# Patient Record
Sex: FEMALE | Race: WHITE | NOT HISPANIC OR LATINO | Employment: OTHER | URBAN - METROPOLITAN AREA
[De-identification: names, ages, dates, MRNs, and addresses within clinical notes are randomized per-mention and may not be internally consistent; named-entity substitution may affect disease eponyms.]

---

## 2023-03-29 DIAGNOSIS — F41.9 ANXIETY DISORDER, UNSPECIFIED: ICD-10-CM

## 2023-03-29 DIAGNOSIS — F32.A DEPRESSION, UNSPECIFIED: ICD-10-CM

## 2023-03-29 RX ORDER — ACETAMINOPHEN 500 MG
1 TABLET ORAL DAILY
COMMUNITY
Start: 2021-03-12

## 2023-03-29 RX ORDER — ROSUVASTATIN CALCIUM 10 MG/1
10 TABLET, COATED ORAL DAILY
COMMUNITY
End: 2023-07-28

## 2023-03-29 RX ORDER — ESTRADIOL 0.1 MG/G
CREAM VAGINAL
COMMUNITY
Start: 2020-11-10 | End: 2024-03-29 | Stop reason: SDUPTHER

## 2023-03-29 RX ORDER — DULOXETIN HYDROCHLORIDE 30 MG/1
1 CAPSULE, DELAYED RELEASE ORAL DAILY
COMMUNITY
Start: 2022-07-07 | End: 2023-09-21

## 2023-03-29 RX ORDER — CALCIUM CARBONATE 600 MG
1 TABLET ORAL DAILY
COMMUNITY
Start: 2021-03-11 | End: 2023-09-21 | Stop reason: SINTOL

## 2023-03-29 RX ORDER — LEVOTHYROXINE SODIUM 75 UG/1
TABLET ORAL
COMMUNITY
Start: 2023-02-20

## 2023-03-29 RX ORDER — ACYCLOVIR 400 MG/1
TABLET ORAL
COMMUNITY

## 2023-03-29 RX ORDER — HYDROXYZINE HYDROCHLORIDE 25 MG/1
25 TABLET, FILM COATED ORAL NIGHTLY
COMMUNITY
End: 2023-09-21 | Stop reason: ALTCHOICE

## 2023-03-30 RX ORDER — HYDROXYZINE HYDROCHLORIDE 25 MG/1
TABLET, FILM COATED ORAL
Qty: 30 TABLET | Refills: 1 | Status: SHIPPED | OUTPATIENT
Start: 2023-03-30 | End: 2023-04-27

## 2023-04-25 DIAGNOSIS — F32.A DEPRESSION, UNSPECIFIED: ICD-10-CM

## 2023-04-25 DIAGNOSIS — F41.9 ANXIETY DISORDER, UNSPECIFIED: ICD-10-CM

## 2023-04-27 RX ORDER — HYDROXYZINE HYDROCHLORIDE 25 MG/1
TABLET, FILM COATED ORAL
Qty: 30 TABLET | Refills: 1 | Status: SHIPPED | OUTPATIENT
Start: 2023-04-27 | End: 2023-05-25

## 2023-05-25 DIAGNOSIS — F41.9 ANXIETY DISORDER, UNSPECIFIED: ICD-10-CM

## 2023-05-25 DIAGNOSIS — F32.A DEPRESSION, UNSPECIFIED: ICD-10-CM

## 2023-05-25 RX ORDER — HYDROXYZINE HYDROCHLORIDE 25 MG/1
TABLET, FILM COATED ORAL
Qty: 30 TABLET | Refills: 1 | Status: SHIPPED | OUTPATIENT
Start: 2023-05-25 | End: 2023-06-16

## 2023-06-16 DIAGNOSIS — F32.A DEPRESSION, UNSPECIFIED: ICD-10-CM

## 2023-06-16 DIAGNOSIS — F41.9 ANXIETY DISORDER, UNSPECIFIED: ICD-10-CM

## 2023-06-16 RX ORDER — MOXIFLOXACIN 5 MG/ML
1 SOLUTION/ DROPS OPHTHALMIC 4 TIMES DAILY
COMMUNITY
Start: 2023-04-27 | End: 2023-09-21 | Stop reason: ALTCHOICE

## 2023-06-16 RX ORDER — PREDNISOLONE ACETATE 10 MG/ML
SUSPENSION/ DROPS OPHTHALMIC
COMMUNITY
End: 2023-09-21 | Stop reason: ALTCHOICE

## 2023-06-16 RX ORDER — HYDROXYZINE HYDROCHLORIDE 25 MG/1
25 TABLET, FILM COATED ORAL NIGHTLY
Qty: 30 TABLET | Refills: 0 | Status: SHIPPED | OUTPATIENT
Start: 2023-06-16 | End: 2023-07-13

## 2023-06-16 RX ORDER — METRONIDAZOLE 7.5 MG/G
CREAM TOPICAL
COMMUNITY
Start: 2023-05-19 | End: 2023-09-21 | Stop reason: ALTCHOICE

## 2023-06-16 RX ORDER — DOXYCYCLINE 50 MG/1
TABLET ORAL
COMMUNITY
Start: 2023-05-19 | End: 2023-09-21 | Stop reason: ALTCHOICE

## 2023-07-13 DIAGNOSIS — F41.9 ANXIETY DISORDER, UNSPECIFIED: ICD-10-CM

## 2023-07-13 DIAGNOSIS — F32.A DEPRESSION, UNSPECIFIED: ICD-10-CM

## 2023-07-13 RX ORDER — HYDROXYZINE HYDROCHLORIDE 25 MG/1
25 TABLET, FILM COATED ORAL NIGHTLY
Qty: 30 TABLET | Refills: 0 | Status: SHIPPED | OUTPATIENT
Start: 2023-07-13 | End: 2023-08-23

## 2023-07-28 DIAGNOSIS — E78.5 HYPERLIPIDEMIA, UNSPECIFIED: ICD-10-CM

## 2023-07-28 RX ORDER — ROSUVASTATIN CALCIUM 10 MG/1
10 TABLET, COATED ORAL DAILY
Qty: 90 TABLET | Refills: 1 | Status: SHIPPED | OUTPATIENT
Start: 2023-07-28 | End: 2023-09-21

## 2023-08-20 DIAGNOSIS — F32.A DEPRESSION, UNSPECIFIED: ICD-10-CM

## 2023-08-20 DIAGNOSIS — F41.9 ANXIETY DISORDER, UNSPECIFIED: ICD-10-CM

## 2023-08-23 RX ORDER — HYDROXYZINE HYDROCHLORIDE 25 MG/1
25 TABLET, FILM COATED ORAL NIGHTLY
Qty: 30 TABLET | Refills: 0 | Status: SHIPPED | OUTPATIENT
Start: 2023-08-23 | End: 2023-09-07

## 2023-09-06 DIAGNOSIS — F32.A DEPRESSION, UNSPECIFIED: ICD-10-CM

## 2023-09-06 DIAGNOSIS — F41.9 ANXIETY DISORDER, UNSPECIFIED: ICD-10-CM

## 2023-09-07 RX ORDER — HYDROXYZINE HYDROCHLORIDE 25 MG/1
25 TABLET, FILM COATED ORAL NIGHTLY
Qty: 90 TABLET | Refills: 1 | Status: SHIPPED | OUTPATIENT
Start: 2023-09-07 | End: 2023-09-21 | Stop reason: ALTCHOICE

## 2023-09-21 ENCOUNTER — OFFICE VISIT (OUTPATIENT)
Dept: PRIMARY CARE | Facility: CLINIC | Age: 68
End: 2023-09-21
Payer: MEDICARE

## 2023-09-21 VITALS
BODY MASS INDEX: 27.42 KG/M2 | HEIGHT: 62 IN | TEMPERATURE: 97.1 F | DIASTOLIC BLOOD PRESSURE: 70 MMHG | WEIGHT: 149 LBS | SYSTOLIC BLOOD PRESSURE: 110 MMHG

## 2023-09-21 DIAGNOSIS — F41.9 ANXIETY AND DEPRESSION: ICD-10-CM

## 2023-09-21 DIAGNOSIS — F32.A ANXIETY AND DEPRESSION: ICD-10-CM

## 2023-09-21 DIAGNOSIS — E04.1 THYROID NODULE: ICD-10-CM

## 2023-09-21 DIAGNOSIS — M54.41 CHRONIC RIGHT-SIDED LOW BACK PAIN WITH RIGHT-SIDED SCIATICA: ICD-10-CM

## 2023-09-21 DIAGNOSIS — E55.9 MILD VITAMIN D DEFICIENCY: ICD-10-CM

## 2023-09-21 DIAGNOSIS — E78.5 DYSLIPIDEMIA: ICD-10-CM

## 2023-09-21 DIAGNOSIS — E03.9 ACQUIRED HYPOTHYROIDISM: ICD-10-CM

## 2023-09-21 DIAGNOSIS — F41.8 INSOMNIA SECONDARY TO DEPRESSION WITH ANXIETY: ICD-10-CM

## 2023-09-21 DIAGNOSIS — G89.29 CHRONIC RIGHT-SIDED LOW BACK PAIN WITH RIGHT-SIDED SCIATICA: ICD-10-CM

## 2023-09-21 DIAGNOSIS — Z00.00 ROUTINE GENERAL MEDICAL EXAMINATION AT HEALTH CARE FACILITY: Primary | ICD-10-CM

## 2023-09-21 DIAGNOSIS — F51.05 INSOMNIA SECONDARY TO DEPRESSION WITH ANXIETY: ICD-10-CM

## 2023-09-21 DIAGNOSIS — Z12.31 ENCOUNTER FOR SCREENING MAMMOGRAM FOR MALIGNANT NEOPLASM OF BREAST: ICD-10-CM

## 2023-09-21 PROBLEM — B02.9 HERPES ZOSTER: Status: ACTIVE | Noted: 2023-09-21

## 2023-09-21 PROBLEM — E53.8 VITAMIN B12 DEFICIENCY: Status: ACTIVE | Noted: 2023-09-21

## 2023-09-21 PROBLEM — H93.19 TINNITUS: Status: ACTIVE | Noted: 2023-09-21

## 2023-09-21 PROBLEM — M85.80 OSTEOPENIA: Status: ACTIVE | Noted: 2023-09-21

## 2023-09-21 PROBLEM — M54.2 NECK PAIN: Status: ACTIVE | Noted: 2023-09-21

## 2023-09-21 PROBLEM — L71.9 ROSACEA: Status: ACTIVE | Noted: 2023-09-21

## 2023-09-21 PROBLEM — N95.2 ATROPHY OF VAGINA: Status: ACTIVE | Noted: 2023-09-21

## 2023-09-21 PROBLEM — M54.50 LOW BACK PAIN: Status: ACTIVE | Noted: 2023-09-21

## 2023-09-21 PROBLEM — R52 GENERALIZED BODY ACHES: Status: ACTIVE | Noted: 2023-09-21

## 2023-09-21 PROCEDURE — 1170F FXNL STATUS ASSESSED: CPT | Performed by: PHYSICIAN ASSISTANT

## 2023-09-21 PROCEDURE — 85025 COMPLETE CBC W/AUTO DIFF WBC: CPT | Performed by: PHYSICIAN ASSISTANT

## 2023-09-21 PROCEDURE — 80053 COMPREHEN METABOLIC PANEL: CPT | Performed by: PHYSICIAN ASSISTANT

## 2023-09-21 PROCEDURE — 84439 ASSAY OF FREE THYROXINE: CPT

## 2023-09-21 PROCEDURE — 1036F TOBACCO NON-USER: CPT | Performed by: PHYSICIAN ASSISTANT

## 2023-09-21 PROCEDURE — 82306 VITAMIN D 25 HYDROXY: CPT | Performed by: PHYSICIAN ASSISTANT

## 2023-09-21 PROCEDURE — G0439 PPPS, SUBSEQ VISIT: HCPCS | Performed by: PHYSICIAN ASSISTANT

## 2023-09-21 PROCEDURE — 80061 LIPID PANEL: CPT | Performed by: PHYSICIAN ASSISTANT

## 2023-09-21 PROCEDURE — 1160F RVW MEDS BY RX/DR IN RCRD: CPT | Performed by: PHYSICIAN ASSISTANT

## 2023-09-21 PROCEDURE — 1159F MED LIST DOCD IN RCRD: CPT | Performed by: PHYSICIAN ASSISTANT

## 2023-09-21 PROCEDURE — 84443 ASSAY THYROID STIM HORMONE: CPT | Performed by: PHYSICIAN ASSISTANT

## 2023-09-21 PROCEDURE — 99214 OFFICE O/P EST MOD 30 MIN: CPT | Performed by: PHYSICIAN ASSISTANT

## 2023-09-21 RX ORDER — METHYLPREDNISOLONE 4 MG/1
TABLET ORAL
Qty: 21 TABLET | Refills: 0 | Status: SHIPPED | OUTPATIENT
Start: 2023-09-21 | End: 2023-09-28

## 2023-09-21 ASSESSMENT — ENCOUNTER SYMPTOMS
ARTHRALGIAS: 0
ABDOMINAL PAIN: 0
SHORTNESS OF BREATH: 0
CHEST TIGHTNESS: 0
POLYPHAGIA: 0
VOMITING: 0
EYE DISCHARGE: 0
COLOR CHANGE: 0
FACIAL SWELLING: 0
NERVOUS/ANXIOUS: 0
FREQUENCY: 0
OCCASIONAL FEELINGS OF UNSTEADINESS: 0
CONFUSION: 0
NUMBNESS: 0
BACK PAIN: 1
COUGH: 0
FEVER: 0
MYALGIAS: 0
JOINT SWELLING: 0
CHILLS: 0
ABDOMINAL DISTENTION: 0
DIARRHEA: 0
WHEEZING: 0
SORE THROAT: 0
ANAL BLEEDING: 0
FATIGUE: 0
POLYDIPSIA: 0
APPETITE CHANGE: 0
HEMATURIA: 0
SLEEP DISTURBANCE: 0
CONSTIPATION: 0
CHOKING: 0
TREMORS: 0
NAUSEA: 0
DEPRESSION: 0
EYE PAIN: 0
HEADACHES: 0
DIFFICULTY URINATING: 0
DIZZINESS: 0
PALPITATIONS: 0
WEAKNESS: 0
LOSS OF SENSATION IN FEET: 0

## 2023-09-21 ASSESSMENT — ACTIVITIES OF DAILY LIVING (ADL)
NEEDS ASSISTANCE WITH FOOD: INDEPENDENT
PREPARING MEALS: INDEPENDENT
GROOMING: INDEPENDENT
DOING HOUSEWORK: INDEPENDENT
ADEQUATE_TO_COMPLETE_ADL: YES
WALKS IN HOME: INDEPENDENT
PATIENT'S MEMORY ADEQUATE TO SAFELY COMPLETE DAILY ACTIVITIES?: YES
TOILETING: INDEPENDENT
PILL BOX USED: NO
HEARING - LEFT EAR: FUNCTIONAL
MANAGING FINANCES: INDEPENDENT
USING TRANSPORTATION: INDEPENDENT
JUDGMENT_ADEQUATE_SAFELY_COMPLETE_DAILY_ACTIVITIES: YES
STIL DRIVING: YES
HEARING - RIGHT EAR: FUNCTIONAL
USING TELEPHONE: INDEPENDENT
BATHING: INDEPENDENT
FEEDING YOURSELF: INDEPENDENT
EATING: INDEPENDENT
TAKING MEDICATION: INDEPENDENT
DRESSING YOURSELF: INDEPENDENT
GROCERY SHOPPING: INDEPENDENT

## 2023-09-21 ASSESSMENT — GERIATRIC MINI NUTRITIONAL ASSESSMENT (MNA)
D HAS SUFFERED PSYCHOLOGICAL STRESS OR ACUTE DISEASE IN THE PAST 3 MONTHS?: NO
C GENERAL MOBILITY: GOES OUT
E NEUROPSYCHOLOGICAL PROBLEMS: NO PSYCHOLOGICAL PROBLEMS
A HAS FOOD INTAKE DECLINED OVER THE PAST 3 MONTHS DUE TO LOSS OF APPETITE, DIGESTIVE PROBLEMS, CHEWING OR SWALLOWING DIFFICULTIES?: NO DECREASE IN FOOD INTAKE
B WEIGHT LOSS DURING THE LAST 3 MONTHS: NO WEIGHT LOSS

## 2023-09-21 ASSESSMENT — PATIENT HEALTH QUESTIONNAIRE - PHQ9
SUM OF ALL RESPONSES TO PHQ9 QUESTIONS 1 AND 2: 0
2. FEELING DOWN, DEPRESSED OR HOPELESS: NOT AT ALL
1. LITTLE INTEREST OR PLEASURE IN DOING THINGS: NOT AT ALL

## 2023-09-21 ASSESSMENT — COGNITIVE AND FUNCTIONAL STATUS - GENERAL: TRAIL MAKING TEST: PATIENT COMPLETES TRAIL MAKING TEST PROPERLY.

## 2023-09-21 NOTE — PROGRESS NOTES
Subjective   Patient ID: Cony Lovell is a 68 y.o. female with a history of hypothyroidism, thyroid nodule, depression, anxiety, insomnia, osteopenia, rosacea, tinnitus, herpes virus, cataracts of both eyes, s/p cataract surgery of left eye who presents for Follow-up and Back Pain.    HPI the patient is complaining of low back pain which she developed in April. The pain comes and goes and radiates to the right leg. It is worse while laying on the right side.  She denies tingling or numbness of the extremities.  She self stopped hydroxyzine since her depression and insomnia had improved.  She sleeps better at night.  She had leg cramps which improved with magnesium 400 mg.  She stopped taking calcium stating that she developed constipation from it.  The patient had Cataract surgery of left eye in May.     Review of Systems   Constitutional:  Negative for appetite change, chills, fatigue and fever.   HENT:  Negative for congestion, ear pain, facial swelling, hearing loss, nosebleeds and sore throat.    Eyes:  Negative for pain, discharge and visual disturbance.   Respiratory:  Negative for cough, choking, chest tightness, shortness of breath and wheezing.    Cardiovascular:  Negative for chest pain, palpitations and leg swelling.   Gastrointestinal:  Negative for abdominal distention, abdominal pain, anal bleeding, constipation, diarrhea, nausea and vomiting.   Endocrine: Negative for cold intolerance, heat intolerance, polydipsia, polyphagia and polyuria.   Genitourinary:  Negative for difficulty urinating, frequency, hematuria and urgency.   Musculoskeletal:  Positive for back pain. Negative for arthralgias, gait problem, joint swelling and myalgias.   Skin:  Negative for color change and rash.   Neurological:  Negative for dizziness, tremors, syncope, weakness, numbness and headaches.   Psychiatric/Behavioral:  Negative for behavioral problems, confusion, sleep disturbance and suicidal ideas. The patient is  "not nervous/anxious.        Objective   /70   Temp 36.2 °C (97.1 °F)   Ht 1.575 m (5' 2\")   Wt 67.6 kg (149 lb)   BMI 27.25 kg/m²     Physical Exam  Constitutional:       General: She is not in acute distress.     Appearance: Normal appearance.   HENT:      Head: Normocephalic and atraumatic.      Nose: Nose normal.   Eyes:      Extraocular Movements: Extraocular movements intact.      Conjunctiva/sclera: Conjunctivae normal.      Pupils: Pupils are equal, round, and reactive to light.   Cardiovascular:      Rate and Rhythm: Normal rate and regular rhythm.      Pulses: Normal pulses.      Heart sounds: Normal heart sounds.   Pulmonary:      Effort: Pulmonary effort is normal.      Breath sounds: Normal breath sounds.   Abdominal:      General: Bowel sounds are normal.      Palpations: Abdomen is soft.   Musculoskeletal:         General: Normal range of motion.      Cervical back: Normal range of motion and neck supple.      Comments: Tenderness on palpation over right piriformis   Skin:     Comments: Facial rosacea   Neurological:      General: No focal deficit present.      Mental Status: She is alert and oriented to person, place, and time.   Psychiatric:         Mood and Affect: Mood normal.         Behavior: Behavior normal.         Thought Content: Thought content normal.         Judgment: Judgment normal.         Assessment/Plan     Wellness exam 9/21/2023  Pneumovax vaccine up to date   Shingrix up to date   Influenza declined  Mammogram ordered  Colonoscopy, ordered, not done  DEXA bone scan 1/23/2023  Pap Smear up-to-date  Follow-up with GYN Dr. Dailey  See dentist twice a year  Yearly eye exam  see dermatology once a year for a skin check.     BMI 27.25  Exercise recommendations:   150 minutes a week to maintain your weight   If you have to lose weight, you need a better diet and exercise plan.     Low back pain  Start Medrol Dosepak as directed  Take it on a full stomach every morning  Apply " heating pads  Do not lift anything heavy  Start physical therapy, referral is given     Depression  Stable  Failed Lexapro due to side effects  Self stopped Cymbalta and hydroxyzine     Insomnia  Due to anxiety and racing thoughts  Self stopped hydroxyzine 25 mg   Doing well     Anxiety  Self stopped hydroxyzine 25 mg at bedtime  Try to cope with anxiety by breathing exercises, listening to music or yoga    Tinnitus  Stable  Self stopped Cymbalta    Muscle cramp  Improved with magnesium 400 mg daily      Hypothyroidism  Continue Euthyrox 75 mcg 6 days a week and skip on Sunday  Continue to exercise regularly  Ultrasound order is placed  Follow up with endocrinology Dr. Roman scheduled October 6     Rosacea  Apply metronidazole gel twice daily     herpes virus  Stable  No recent exacerbations  acyclovir 400 mg twice daily for prophylaxis     Osteopenia  Self stopped calcium due to constipation.  Continue Vitamin D - 2000 IU daily   Bone density 1/23/2023    Atrophic vaginitis  On estradiol cream  Follow-up with gynecology    We obtained blood work  I will call with results

## 2023-09-21 NOTE — PROGRESS NOTES
"Subjective   Reason for Visit: Cony Lovell is an 68 y.o. female here for a Medicare Wellness visit.     Past Medical, Surgical, and Family History reviewed and updated in chart.    Reviewed all medications by prescribing practitioner or clinical pharmacist (such as prescriptions, OTCs, herbal therapies and supplements) and documented in the medical record.    HPI    Patient Care Team:  JULIO CÉSAR Martinez as PCP - General (Internal Medicine)     Review of Systems    Objective   Vitals:  /70   Temp 36.2 °C (97.1 °F)   Ht 1.575 m (5' 2\")   Wt 67.6 kg (149 lb)   BMI 27.25 kg/m²       Physical Exam    Assessment/Plan   Problem List Items Addressed This Visit       Anxiety and depression    Relevant Orders    CBC    Comprehensive Metabolic Panel    Dyslipidemia    Relevant Orders    Lipid Panel    Insomnia secondary to depression with anxiety    Low back pain    Relevant Medications    methylPREDNISolone (Medrol Dospak) 4 mg tablets    Other Relevant Orders    Referral to Physical Therapy    Mild vitamin D deficiency    Relevant Orders    Vitamin D 25-Hydroxy,Total (for eval of Vitamin D levels)    Hypothyroidism    Relevant Orders    Thyroid Stimulating Hormone    Thyroxine, Free    Triiodothyronine, Free    Thyroid nodule    Relevant Orders    US thyroid    Routine general medical examination at health care facility - Primary    Relevant Orders    1 Year Follow Up In Primary Care - Wellness Exam          "

## 2023-09-22 LAB — THYROXINE (T4) FREE (NG/DL) IN SER/PLAS: 1.36 NG/DL (ref 0.78–1.48)

## 2023-11-22 ENCOUNTER — APPOINTMENT (OUTPATIENT)
Dept: RADIOLOGY | Facility: CLINIC | Age: 68
End: 2023-11-22
Payer: COMMERCIAL

## 2024-03-28 RX ORDER — PREDNISOLONE ACETATE 10 MG/ML
1 SUSPENSION/ DROPS OPHTHALMIC 4 TIMES DAILY
COMMUNITY
Start: 2023-11-20

## 2024-03-28 RX ORDER — MOXIFLOXACIN 5 MG/ML
1 SOLUTION/ DROPS OPHTHALMIC 4 TIMES DAILY
COMMUNITY
Start: 2023-11-20

## 2024-03-29 ENCOUNTER — OFFICE VISIT (OUTPATIENT)
Dept: PRIMARY CARE | Facility: CLINIC | Age: 69
End: 2024-03-29
Payer: MEDICARE

## 2024-03-29 VITALS
TEMPERATURE: 97.3 F | DIASTOLIC BLOOD PRESSURE: 78 MMHG | HEIGHT: 62 IN | SYSTOLIC BLOOD PRESSURE: 120 MMHG | WEIGHT: 149 LBS | BODY MASS INDEX: 27.42 KG/M2

## 2024-03-29 DIAGNOSIS — E78.5 DYSLIPIDEMIA: ICD-10-CM

## 2024-03-29 DIAGNOSIS — E53.8 VITAMIN B12 DEFICIENCY: ICD-10-CM

## 2024-03-29 DIAGNOSIS — R35.0 URINARY FREQUENCY: Primary | ICD-10-CM

## 2024-03-29 DIAGNOSIS — R07.89 ATYPICAL CHEST PAIN: ICD-10-CM

## 2024-03-29 DIAGNOSIS — F41.9 ANXIETY AND DEPRESSION: ICD-10-CM

## 2024-03-29 DIAGNOSIS — E03.9 ACQUIRED HYPOTHYROIDISM: ICD-10-CM

## 2024-03-29 DIAGNOSIS — E55.9 MILD VITAMIN D DEFICIENCY: ICD-10-CM

## 2024-03-29 DIAGNOSIS — F32.A ANXIETY AND DEPRESSION: ICD-10-CM

## 2024-03-29 DIAGNOSIS — Z12.31 ENCOUNTER FOR SCREENING MAMMOGRAM FOR MALIGNANT NEOPLASM OF BREAST: Primary | ICD-10-CM

## 2024-03-29 DIAGNOSIS — Z01.419 WELL WOMAN EXAM: ICD-10-CM

## 2024-03-29 DIAGNOSIS — N95.2 ATROPHY OF VAGINA: ICD-10-CM

## 2024-03-29 PROBLEM — E78.00 HYPERCHOLESTEREMIA: Status: ACTIVE | Noted: 2023-10-09

## 2024-03-29 PROBLEM — H25.11 NUCLEAR SENILE CATARACT OF RIGHT EYE: Status: ACTIVE | Noted: 2023-03-16

## 2024-03-29 LAB
APPEARANCE UR: CLEAR
BILIRUB UR QL STRIP: NEGATIVE
COLOR UR: YELLOW
GLUCOSE UR STRIP-MCNC: NEGATIVE MG/DL
HGB UR QL STRIP: ABNORMAL
KETONES UR STRIP-MCNC: NEGATIVE MG/DL
LEUKOCYTE ESTERASE UR QL STRIP: ABNORMAL
NITRITE UR QL STRIP: NEGATIVE
PH UR STRIP: 6 [PH]
PROT UR STRIP-MCNC: NEGATIVE MG/DL
SP GR UR STRIP.AUTO: <=1.005
UROBILINOGEN UR STRIP-ACNC: 0.2 E.U./DL

## 2024-03-29 PROCEDURE — 80061 LIPID PANEL: CPT | Performed by: PHYSICIAN ASSISTANT

## 2024-03-29 PROCEDURE — 84460 ALANINE AMINO (ALT) (SGPT): CPT | Performed by: PHYSICIAN ASSISTANT

## 2024-03-29 PROCEDURE — 85025 COMPLETE CBC W/AUTO DIFF WBC: CPT | Performed by: PHYSICIAN ASSISTANT

## 2024-03-29 PROCEDURE — 80048 BASIC METABOLIC PNL TOTAL CA: CPT | Performed by: PHYSICIAN ASSISTANT

## 2024-03-29 PROCEDURE — 99214 OFFICE O/P EST MOD 30 MIN: CPT | Performed by: PHYSICIAN ASSISTANT

## 2024-03-29 PROCEDURE — 1160F RVW MEDS BY RX/DR IN RCRD: CPT | Performed by: PHYSICIAN ASSISTANT

## 2024-03-29 PROCEDURE — 82306 VITAMIN D 25 HYDROXY: CPT | Performed by: PHYSICIAN ASSISTANT

## 2024-03-29 PROCEDURE — 81003 URINALYSIS AUTO W/O SCOPE: CPT | Performed by: PHYSICIAN ASSISTANT

## 2024-03-29 PROCEDURE — 1126F AMNT PAIN NOTED NONE PRSNT: CPT | Performed by: PHYSICIAN ASSISTANT

## 2024-03-29 PROCEDURE — 93000 ELECTROCARDIOGRAM COMPLETE: CPT | Performed by: PHYSICIAN ASSISTANT

## 2024-03-29 PROCEDURE — 84450 TRANSFERASE (AST) (SGOT): CPT | Performed by: PHYSICIAN ASSISTANT

## 2024-03-29 PROCEDURE — 82607 VITAMIN B-12: CPT | Performed by: PHYSICIAN ASSISTANT

## 2024-03-29 PROCEDURE — 1036F TOBACCO NON-USER: CPT | Performed by: PHYSICIAN ASSISTANT

## 2024-03-29 PROCEDURE — 84443 ASSAY THYROID STIM HORMONE: CPT | Performed by: PHYSICIAN ASSISTANT

## 2024-03-29 PROCEDURE — 87086 URINE CULTURE/COLONY COUNT: CPT

## 2024-03-29 PROCEDURE — 1159F MED LIST DOCD IN RCRD: CPT | Performed by: PHYSICIAN ASSISTANT

## 2024-03-29 RX ORDER — ESTRADIOL 0.1 MG/G
2 CREAM VAGINAL
Qty: 42.5 G | Refills: 1 | Status: SHIPPED | OUTPATIENT
Start: 2024-03-29

## 2024-03-29 ASSESSMENT — ENCOUNTER SYMPTOMS
CONFUSION: 0
APPETITE CHANGE: 0
WHEEZING: 0
CONSTIPATION: 0
TREMORS: 0
JOINT SWELLING: 0
DIARRHEA: 0
WEAKNESS: 0
FACIAL SWELLING: 0
NAUSEA: 0
FATIGUE: 0
HEMATURIA: 0
CHOKING: 0
BACK PAIN: 0
EYE PAIN: 0
ARTHRALGIAS: 0
SORE THROAT: 0
PALPITATIONS: 0
ABDOMINAL DISTENTION: 0
FEVER: 0
COUGH: 0
CHILLS: 0
NUMBNESS: 0
VOMITING: 0
DIFFICULTY URINATING: 0
POLYPHAGIA: 0
SHORTNESS OF BREATH: 0
ABDOMINAL PAIN: 0
FREQUENCY: 0
MYALGIAS: 0
NERVOUS/ANXIOUS: 1
HEADACHES: 0
DIZZINESS: 0
SLEEP DISTURBANCE: 1
CHEST TIGHTNESS: 0
POLYDIPSIA: 0
EYE DISCHARGE: 0
COLOR CHANGE: 0
ANAL BLEEDING: 0

## 2024-03-29 ASSESSMENT — PAIN SCALES - GENERAL: PAINLEVEL: 0-NO PAIN

## 2024-03-29 NOTE — PROGRESS NOTES
Subjective   Patient ID: Cony Lovell is a 68 y.o. female with a history of hypothyroidism, thyroid nodule, depression, anxiety, insomnia, osteopenia, rosacea, tinnitus, herpes virus, cataracts of both eyes, s/p cataract surgery who presents for Anxiety, Fatigue, and Nocturia.    HPI the patient is is presented with complaints of fatigue.  Stated that she does not sleep well at night due to waking up twice to use the restroom and cannot fall back asleep.  She sleeps approximately 5 hours a night.  Per patient, if she sleeps more she feels a lot better.  She has been having anxiety with chest pains due to her 's health.  She started taking valerian root and feels a little better.  She denies depression.  The patient's last LDL was elevated and the patient started low-fat diet.  She tries to exercise couple of times a week but gets winded.    Review of Systems   Constitutional:  Negative for appetite change, chills, fatigue and fever.   HENT:  Negative for congestion, ear pain, facial swelling, hearing loss, nosebleeds and sore throat.    Eyes:  Negative for pain, discharge and visual disturbance.   Respiratory:  Negative for cough, choking, chest tightness, shortness of breath and wheezing.    Cardiovascular:  Negative for chest pain, palpitations and leg swelling.   Gastrointestinal:  Negative for abdominal distention, abdominal pain, anal bleeding, constipation, diarrhea, nausea and vomiting.   Endocrine: Negative for cold intolerance, heat intolerance, polydipsia, polyphagia and polyuria.   Genitourinary:  Negative for difficulty urinating, frequency, hematuria and urgency.   Musculoskeletal:  Negative for arthralgias, back pain, gait problem, joint swelling and myalgias.   Skin:  Negative for color change and rash.   Neurological:  Negative for dizziness, tremors, syncope, weakness, numbness and headaches.   Psychiatric/Behavioral:  Positive for sleep disturbance. Negative for behavioral problems,  "confusion and suicidal ideas. The patient is nervous/anxious.        Objective   /78   Temp 36.3 °C (97.3 °F)   Ht 1.575 m (5' 2\")   Wt 67.6 kg (149 lb)   Breastfeeding No   BMI 27.25 kg/m²     Physical Exam  Constitutional:       General: She is not in acute distress.     Appearance: Normal appearance.   HENT:      Head: Normocephalic and atraumatic.      Nose: Nose normal.   Eyes:      Extraocular Movements: Extraocular movements intact.      Conjunctiva/sclera: Conjunctivae normal.      Pupils: Pupils are equal, round, and reactive to light.   Cardiovascular:      Rate and Rhythm: Normal rate and regular rhythm.      Pulses: Normal pulses.      Heart sounds: Normal heart sounds.   Pulmonary:      Effort: Pulmonary effort is normal.      Breath sounds: Normal breath sounds.   Abdominal:      General: Bowel sounds are normal.      Palpations: Abdomen is soft.   Musculoskeletal:         General: Normal range of motion.      Cervical back: Normal range of motion and neck supple.   Skin:     Comments: Facial rosacea   Neurological:      General: No focal deficit present.      Mental Status: She is alert and oriented to person, place, and time.   Psychiatric:         Mood and Affect: Mood normal.         Behavior: Behavior normal.         Thought Content: Thought content normal.         Judgment: Judgment normal.         Assessment/Plan   Fatigue  Most likely due to lack of sleep from nocturia  We obtained blood work including TSH and urinalysis  Urinalysis is positive for leukocytes and blood  Will send it for culture    Atypical chest pain   Could be due to anxiety  We obtained EKG today  Bradycardia   Left posterior fascicular block  Inferior infarct, age indeterminant  Try to cope with anxiety by breathing exercises, meditation, yoga, continue valerian root at night    Anxiety  Was on hydroxyzine in the past  Continue valerian root at bedtime  Try to cope with anxiety by breathing exercises, listening to " music or yoga    Nocturia  Urinalysis is positive for leukocytes and blood  Will send it for culture  The patient was advised to be seen by urogynecology Dr. Carvajal, referral is given    Dyslipidemia  Declined medications  The patient is currently on low fat low cholesterol diet  I recommend Mediterranean diet, which include fish, chicken, vegetables and olive oil  Exercise daily for 30 minutes at least 3 times a week  CT cardiac score, requisition is given to the patient  We obtained lipid panel today    Low back pain  Improved  Apply heating pads    Depression  Stable  Failed Lexapro due to side effects  Self stopped Cymbalta and hydroxyzine    Tinnitus  Stable    Muscle cramp  Improved with magnesium 400 mg daily      Hypothyroidism  Continue Euthyrox 75 mcg 6 days a week and skip on Sunday  Continue to exercise regularly  Follow up with endocrinology Dr. Roman scheduled October 6     Rosacea  Apply metronidazole gel twice daily     herpes virus  Stable  No recent exacerbations  acyclovir 400 mg twice daily for prophylaxis     Osteopenia  Self stopped calcium due to constipation.  Continue Vitamin D - 2000 IU daily   Bone density 1/23/2023    Atrophic vaginitis  On estradiol cream 0.01% once a week  gynecology has retired, referral is given    Body mass index is 27.25 kg/m².  Continue low-fat low-cholesterol diet  Try to exercise at least 30 minutes daily    Wellness exam 9/21/2023  Pneumovax vaccine up to date   Shingrix up to date   Influenza declined  Mammogram ordered  Colonoscopy, ordered, not done  DEXA bone scan 1/23/2023  Pap Smear up-to-date  Follow-up with GYN Dr. Dailey  See dentist twice a year  Yearly eye exam  see dermatology once a year for a skin check.    We obtained fasting blood work  I will call with results

## 2024-03-30 LAB — BACTERIA UR CULT: NORMAL

## 2024-04-18 ENCOUNTER — HOSPITAL ENCOUNTER (OUTPATIENT)
Dept: RADIOLOGY | Facility: CLINIC | Age: 69
Discharge: HOME | End: 2024-04-18
Payer: MEDICARE

## 2024-04-18 VITALS — BODY MASS INDEX: 27.43 KG/M2 | HEIGHT: 62 IN | WEIGHT: 149.03 LBS

## 2024-04-18 DIAGNOSIS — Z12.31 ENCOUNTER FOR SCREENING MAMMOGRAM FOR MALIGNANT NEOPLASM OF BREAST: ICD-10-CM

## 2024-04-18 PROCEDURE — 77067 SCR MAMMO BI INCL CAD: CPT

## 2024-04-18 PROCEDURE — 77067 SCR MAMMO BI INCL CAD: CPT | Performed by: RADIOLOGY

## 2024-04-18 PROCEDURE — 77063 BREAST TOMOSYNTHESIS BI: CPT | Performed by: RADIOLOGY

## 2024-04-19 DIAGNOSIS — H93.13 TINNITUS OF BOTH EARS: Primary | ICD-10-CM

## 2024-06-03 ENCOUNTER — HOSPITAL ENCOUNTER (OUTPATIENT)
Dept: RADIOLOGY | Facility: HOSPITAL | Age: 69
Discharge: HOME | End: 2024-06-03
Payer: MEDICARE

## 2024-06-03 DIAGNOSIS — E78.5 DYSLIPIDEMIA: ICD-10-CM

## 2024-06-03 PROCEDURE — 75571 CT HRT W/O DYE W/CA TEST: CPT

## 2024-06-06 ENCOUNTER — APPOINTMENT (OUTPATIENT)
Dept: OTOLARYNGOLOGY | Facility: CLINIC | Age: 69
End: 2024-06-06
Payer: MEDICARE

## 2024-09-23 ENCOUNTER — HOSPITAL ENCOUNTER (OUTPATIENT)
Dept: RADIOLOGY | Facility: CLINIC | Age: 69
End: 2024-09-23
Payer: MEDICARE

## 2024-10-24 ENCOUNTER — APPOINTMENT (OUTPATIENT)
Dept: NEUROLOGY | Facility: CLINIC | Age: 69
End: 2024-10-24
Payer: MEDICARE

## 2024-11-08 ENCOUNTER — OFFICE VISIT (OUTPATIENT)
Dept: PRIMARY CARE | Facility: CLINIC | Age: 69
End: 2024-11-08
Payer: COMMERCIAL

## 2024-11-08 VITALS
BODY MASS INDEX: 26.5 KG/M2 | HEIGHT: 62 IN | DIASTOLIC BLOOD PRESSURE: 68 MMHG | TEMPERATURE: 98.1 F | SYSTOLIC BLOOD PRESSURE: 100 MMHG | WEIGHT: 144 LBS

## 2024-11-08 DIAGNOSIS — F32.A ANXIETY AND DEPRESSION: ICD-10-CM

## 2024-11-08 DIAGNOSIS — E55.9 MILD VITAMIN D DEFICIENCY: ICD-10-CM

## 2024-11-08 DIAGNOSIS — R07.89 ATYPICAL CHEST PAIN: ICD-10-CM

## 2024-11-08 DIAGNOSIS — B37.31 VAGINAL YEAST INFECTION: ICD-10-CM

## 2024-11-08 DIAGNOSIS — N95.2 ATROPHY OF VAGINA: ICD-10-CM

## 2024-11-08 DIAGNOSIS — M54.2 NECK PAIN: ICD-10-CM

## 2024-11-08 DIAGNOSIS — E78.00 HYPERCHOLESTEREMIA: ICD-10-CM

## 2024-11-08 DIAGNOSIS — R35.0 URINARY FREQUENCY: ICD-10-CM

## 2024-11-08 DIAGNOSIS — L71.9 ROSACEA: ICD-10-CM

## 2024-11-08 DIAGNOSIS — R53.83 OTHER FATIGUE: ICD-10-CM

## 2024-11-08 DIAGNOSIS — B02.9 HERPES ZOSTER WITHOUT COMPLICATION: ICD-10-CM

## 2024-11-08 DIAGNOSIS — E04.1 THYROID NODULE: ICD-10-CM

## 2024-11-08 DIAGNOSIS — F41.9 ANXIETY AND DEPRESSION: ICD-10-CM

## 2024-11-08 DIAGNOSIS — Z00.00 ROUTINE GENERAL MEDICAL EXAMINATION AT HEALTH CARE FACILITY: Primary | ICD-10-CM

## 2024-11-08 DIAGNOSIS — E03.9 ACQUIRED HYPOTHYROIDISM: ICD-10-CM

## 2024-11-08 DIAGNOSIS — R31.21 ASYMPTOMATIC MICROSCOPIC HEMATURIA: ICD-10-CM

## 2024-11-08 LAB
ALT SERPL W P-5'-P-CCNC: 21 U/L (ref 16–63)
ANION GAP SERPL CALC-SCNC: 13 MMOL/L (ref 10–20)
APPEARANCE UR: CLEAR
APPEARANCE UR: CLEAR
AST SERPL W P-5'-P-CCNC: 19 U/L (ref 15–37)
BASOPHILS # BLD AUTO: 0.01 X10*3/UL (ref 0.1–1.6)
BASOPHILS NFR BLD AUTO: 0.29 % (ref 0–0.3)
BILIRUB UR QL STRIP: NEGATIVE
BILIRUB UR QL STRIP: NEGATIVE
BUN SERPL-MCNC: 8 MG/DL (ref 7–18)
CALCIUM SERPL-MCNC: 9.2 MG/DL (ref 8.5–10.1)
CHLORIDE SERPL-SCNC: 102 MMOL/L (ref 98–107)
CHOLEST SERPL-MCNC: 255 MG/DL (ref 0–199)
CHOLESTEROL/HDL RATIO: 3.3 (ref 4.2–7)
CO2 SERPL-SCNC: 28 MMOL/L (ref 21–32)
COLOR UR: YELLOW
COLOR UR: YELLOW
CREAT SERPL-MCNC: 0.76 MG/DL (ref 0.6–1.1)
EGFRCR SERPLBLD CKD-EPI 2021: 85 ML/MIN/1.73M*2
EOSINOPHIL # BLD AUTO: 0.02 X10*3/UL (ref 0.04–0.5)
EOSINOPHIL NFR BLD AUTO: 0.52 % (ref 0.7–7)
ERYTHROCYTE [DISTWIDTH] IN BLOOD BY AUTOMATED COUNT: 14.1 % (ref 11.5–14.5)
GLUCOSE SERPL-MCNC: 103 MG/DL (ref 74–100)
GLUCOSE UR STRIP-MCNC: NEGATIVE MG/DL
GLUCOSE UR STRIP-MCNC: NEGATIVE MG/DL
HCT VFR BLD AUTO: 43 % (ref 36.6–46.6)
HDLC SERPL-MCNC: 77 MG/DL (ref 40–59)
HGB BLD-MCNC: 14.32 G/DL (ref 12–15.4)
HGB UR QL STRIP: ABNORMAL
HGB UR QL STRIP: ABNORMAL
IS PATIENT FASTING: YES
KETONES UR STRIP-MCNC: ABNORMAL MG/DL
KETONES UR STRIP-MCNC: ABNORMAL MG/DL
LDLC SERPL DIRECT ASSAY-MCNC: 159 MG/DL (ref 0–100)
LEUKOCYTE ESTERASE UR QL STRIP: ABNORMAL
LEUKOCYTE ESTERASE UR QL STRIP: ABNORMAL
LYMPHOCYTES # BLD AUTO: 1.73 X10*3/UL (ref 0–6)
LYMPHOCYTES NFR BLD AUTO: 36.81 % (ref 20.5–51.1)
MCH RBC QN AUTO: 31 PG (ref 26–32)
MCHC RBC AUTO-ENTMCNC: 33.3 G/DL (ref 31–38)
MCV RBC AUTO: 93 FL (ref 80–96)
MONOCYTES # BLD AUTO: 0.39 X10*3/UL (ref 1.6–24.9)
MONOCYTES NFR BLD AUTO: 8.36 % (ref 1.7–9.3)
NEUTROPHILS # BLD AUTO: 2.54 X10*3/UL (ref 1.4–6.5)
NEUTROPHILS NFR BLD AUTO: 54.02 % (ref 42.2–75.2)
NITRITE UR QL STRIP: NEGATIVE
NITRITE UR QL STRIP: NEGATIVE
PH UR STRIP: 5.5 [PH]
PH UR STRIP: 5.5 [PH]
PLATELET # BLD AUTO: 251.7 X10*3/UL (ref 150–450)
PMV BLD AUTO: 7.69 FL (ref 7.8–11)
POTASSIUM SERPL-SCNC: 4.3 MMOL/L (ref 3.5–5.1)
PROT UR STRIP-MCNC: NEGATIVE MG/DL
PROT UR STRIP-MCNC: NEGATIVE MG/DL
RBC # BLD AUTO: 4.62 X10*6/UL (ref 3.9–5.3)
SODIUM SERPL-SCNC: 139 MMOL/L (ref 136–145)
SP GR UR STRIP.AUTO: <=1.005
SP GR UR STRIP.AUTO: <=1.005
TRIGL SERPL-MCNC: 71 MG/DL
TSH SERPL-ACNC: 1.76 MIU/L (ref 0.44–3.98)
UROBILINOGEN UR STRIP-ACNC: 0.2 E.U./DL
UROBILINOGEN UR STRIP-ACNC: 0.2 E.U./DL
WBC # BLD AUTO: 4.71 X10*3/UL (ref 4.5–10.5)

## 2024-11-08 PROCEDURE — 87086 URINE CULTURE/COLONY COUNT: CPT

## 2024-11-08 RX ORDER — IBUPROFEN 800 MG/1
TABLET ORAL
COMMUNITY
Start: 2024-07-01 | End: 2024-11-08 | Stop reason: ALTCHOICE

## 2024-11-08 RX ORDER — HYDROXYZINE HYDROCHLORIDE 25 MG/1
TABLET, FILM COATED ORAL
COMMUNITY
Start: 2022-09-22

## 2024-11-08 RX ORDER — METRONIDAZOLE 7.5 MG/G
CREAM TOPICAL 2 TIMES DAILY
Qty: 45 G | Refills: 2 | Status: SHIPPED | OUTPATIENT
Start: 2024-11-08 | End: 2025-11-08

## 2024-11-08 RX ORDER — PENICILLIN V POTASSIUM 500 MG/1
500 TABLET, FILM COATED ORAL 4 TIMES DAILY
COMMUNITY
Start: 2024-07-16 | End: 2024-11-08 | Stop reason: ALTCHOICE

## 2024-11-08 RX ORDER — FLUCONAZOLE 100 MG/1
100 TABLET ORAL DAILY
Qty: 5 TABLET | Refills: 0 | Status: SHIPPED | OUTPATIENT
Start: 2024-11-08 | End: 2024-11-13

## 2024-11-08 RX ORDER — ROSUVASTATIN CALCIUM 10 MG/1
TABLET, COATED ORAL
COMMUNITY
Start: 2022-09-23

## 2024-11-08 RX ORDER — ACYCLOVIR 400 MG/1
400 TABLET ORAL DAILY
Qty: 90 TABLET | Refills: 3 | Status: SHIPPED | OUTPATIENT
Start: 2024-11-08

## 2024-11-08 ASSESSMENT — ENCOUNTER SYMPTOMS
HEMATURIA: 0
NAUSEA: 0
WEAKNESS: 0
CONSTIPATION: 0
EYE PAIN: 0
CONFUSION: 0
WHEEZING: 0
COLOR CHANGE: 0
HEADACHES: 0
LOSS OF SENSATION IN FEET: 0
ARTHRALGIAS: 0
PALPITATIONS: 0
POLYDIPSIA: 0
BACK PAIN: 0
APPETITE CHANGE: 0
CHOKING: 0
FATIGUE: 0
VOMITING: 0
DEPRESSION: 0
EYE DISCHARGE: 0
MYALGIAS: 0
JOINT SWELLING: 0
FACIAL SWELLING: 0
OCCASIONAL FEELINGS OF UNSTEADINESS: 0
ABDOMINAL PAIN: 0
POLYPHAGIA: 0
SORE THROAT: 0
DIARRHEA: 0
DIFFICULTY URINATING: 0
SLEEP DISTURBANCE: 1
COUGH: 0
ANAL BLEEDING: 0
FREQUENCY: 0
CHEST TIGHTNESS: 0
CHILLS: 0
FEVER: 0
ROS SKIN COMMENTS: ROSACEA
NUMBNESS: 0
NERVOUS/ANXIOUS: 1
SHORTNESS OF BREATH: 0
TREMORS: 0
ABDOMINAL DISTENTION: 0
DIZZINESS: 0

## 2024-11-08 ASSESSMENT — GERIATRIC MINI NUTRITIONAL ASSESSMENT (MNA)
B WEIGHT LOSS DURING THE LAST 3 MONTHS: NO WEIGHT LOSS
C GENERAL MOBILITY: GOES OUT
A HAS FOOD INTAKE DECLINED OVER THE PAST 3 MONTHS DUE TO LOSS OF APPETITE, DIGESTIVE PROBLEMS, CHEWING OR SWALLOWING DIFFICULTIES?: NO DECREASE IN FOOD INTAKE
E NEUROPSYCHOLOGICAL PROBLEMS: NO PSYCHOLOGICAL PROBLEMS
D HAS SUFFERED PSYCHOLOGICAL STRESS OR ACUTE DISEASE IN THE PAST 3 MONTHS?: NO

## 2024-11-08 ASSESSMENT — ACTIVITIES OF DAILY LIVING (ADL)
HEARING - LEFT EAR: FUNCTIONAL
PATIENT'S MEMORY ADEQUATE TO SAFELY COMPLETE DAILY ACTIVITIES?: YES
PREPARING MEALS: INDEPENDENT
ADEQUATE_TO_COMPLETE_ADL: YES
FEEDING YOURSELF: INDEPENDENT
HEARING - RIGHT EAR: FUNCTIONAL
MANAGING FINANCES: INDEPENDENT
BATHING: INDEPENDENT
JUDGMENT_ADEQUATE_SAFELY_COMPLETE_DAILY_ACTIVITIES: YES
DOING HOUSEWORK: INDEPENDENT
GROOMING: INDEPENDENT
TOILETING: INDEPENDENT
USING TRANSPORTATION: NEEDS ASSISTANCE
NEEDS ASSISTANCE WITH FOOD: INDEPENDENT
WALKS IN HOME: INDEPENDENT
GROCERY SHOPPING: INDEPENDENT
USING TELEPHONE: INDEPENDENT
EATING: INDEPENDENT
STIL DRIVING: NO
PILL BOX USED: NO
DRESSING YOURSELF: INDEPENDENT
TAKING MEDICATION: INDEPENDENT

## 2024-11-08 ASSESSMENT — COLUMBIA-SUICIDE SEVERITY RATING SCALE - C-SSRS
1. IN THE PAST MONTH, HAVE YOU WISHED YOU WERE DEAD OR WISHED YOU COULD GO TO SLEEP AND NOT WAKE UP?: NO
1. IN THE PAST MONTH, HAVE YOU WISHED YOU WERE DEAD OR WISHED YOU COULD GO TO SLEEP AND NOT WAKE UP?: NO
6. HAVE YOU EVER DONE ANYTHING, STARTED TO DO ANYTHING, OR PREPARED TO DO ANYTHING TO END YOUR LIFE?: NO
6. HAVE YOU EVER DONE ANYTHING, STARTED TO DO ANYTHING, OR PREPARED TO DO ANYTHING TO END YOUR LIFE?: NO
2. HAVE YOU ACTUALLY HAD ANY THOUGHTS OF KILLING YOURSELF?: NO
1. IN THE PAST MONTH, HAVE YOU WISHED YOU WERE DEAD OR WISHED YOU COULD GO TO SLEEP AND NOT WAKE UP?: NO
6. HAVE YOU EVER DONE ANYTHING, STARTED TO DO ANYTHING, OR PREPARED TO DO ANYTHING TO END YOUR LIFE?: NO
2. HAVE YOU ACTUALLY HAD ANY THOUGHTS OF KILLING YOURSELF?: NO
2. HAVE YOU ACTUALLY HAD ANY THOUGHTS OF KILLING YOURSELF?: NO

## 2024-11-08 ASSESSMENT — PAIN SCALES - GENERAL: PAINLEVEL_OUTOF10: 4

## 2024-11-08 ASSESSMENT — PATIENT HEALTH QUESTIONNAIRE - PHQ9
SUM OF ALL RESPONSES TO PHQ9 QUESTIONS 1 AND 2: 0
SUM OF ALL RESPONSES TO PHQ9 QUESTIONS 1 AND 2: 0
2. FEELING DOWN, DEPRESSED OR HOPELESS: NOT AT ALL
2. FEELING DOWN, DEPRESSED OR HOPELESS: NOT AT ALL
1. LITTLE INTEREST OR PLEASURE IN DOING THINGS: NOT AT ALL
1. LITTLE INTEREST OR PLEASURE IN DOING THINGS: NOT AT ALL

## 2024-11-08 NOTE — ASSESSMENT & PLAN NOTE
Orders:    metroNIDAZOLE (Metrocream) 0.75 % cream; Apply topically 2 times a day. Apply twice daily to rosacea

## 2024-11-08 NOTE — PROGRESS NOTES
Subjective   Patient ID: Cony Lovell is a 69 y.o. female with a history of hypothyroidism, thyroid nodule, depression, anxiety, insomnia, osteopenia, rosacea, tinnitus, herpes virus, cataracts of both eyes, s/p cataract surgery who presents for Medicare Annual Wellness Visit Initial (C/o: dizziness/Onset:  2 days).    HPI the patient is is presented with complaints of vaginal itchiness, vaginal discharge, and burning which she developed 2 weeks ago. Stated that she noticed the symptoms while being in [place].  Additionally, she is complaining of neck pain which comes and goes.  Denies tingling or numbness of upper extremities.  Also, she is complaining of rosacea.    Her mood is stable.  She sleeps well through the night.  Denies shortness of breath, wheezing or leg edema.  She stopped taking rosuvastatin.    Review of Systems   Constitutional:  Negative for appetite change, chills, fatigue and fever.   HENT:  Negative for congestion, ear pain, facial swelling, hearing loss, nosebleeds and sore throat.    Eyes:  Negative for pain, discharge and visual disturbance.   Respiratory:  Negative for cough, choking, chest tightness, shortness of breath and wheezing.    Cardiovascular:  Negative for chest pain, palpitations and leg swelling.   Gastrointestinal:  Negative for abdominal distention, abdominal pain, anal bleeding, constipation, diarrhea, nausea and vomiting.   Endocrine: Negative for cold intolerance, heat intolerance, polydipsia, polyphagia and polyuria.   Genitourinary:  Positive for vaginal discharge. Negative for difficulty urinating, frequency, hematuria and urgency.   Musculoskeletal:  Negative for arthralgias, back pain, gait problem, joint swelling and myalgias.        Neck pain   Skin:  Negative for color change and rash.        Rosacea   Neurological:  Negative for dizziness, tremors, syncope, weakness, numbness and headaches.   Psychiatric/Behavioral:  Positive for sleep disturbance. Negative  "for behavioral problems, confusion and suicidal ideas. The patient is nervous/anxious.        Objective   /68 (Patient Position: Sitting)   Temp 36.7 °C (98.1 °F) (Temporal)   Ht 1.575 m (5' 2\")   Wt 65.3 kg (144 lb)   Breastfeeding No   BMI 26.34 kg/m²     Physical Exam  Constitutional:       General: She is not in acute distress.     Appearance: Normal appearance.   HENT:      Head: Normocephalic and atraumatic.      Nose: Nose normal.   Eyes:      Extraocular Movements: Extraocular movements intact.      Conjunctiva/sclera: Conjunctivae normal.      Pupils: Pupils are equal, round, and reactive to light.   Cardiovascular:      Rate and Rhythm: Normal rate and regular rhythm.      Pulses: Normal pulses.      Heart sounds: Normal heart sounds.   Pulmonary:      Effort: Pulmonary effort is normal.      Breath sounds: Normal breath sounds.   Abdominal:      General: Bowel sounds are normal.      Palpations: Abdomen is soft.   Musculoskeletal:         General: Normal range of motion.      Cervical back: Normal range of motion and neck supple.   Skin:     Comments: Facial rosacea   Neurological:      General: No focal deficit present.      Mental Status: She is alert and oriented to person, place, and time.   Psychiatric:         Mood and Affect: Mood normal.         Behavior: Behavior normal.         Thought Content: Thought content normal.         Judgment: Judgment normal.         Assessment/Plan   Vaginal itchiness, discharge  Start Diflucan 100 mg for 5 days    Cervicalgia  Start physical therapy, referral is given  Apply heating pads  Take Tylenol Extra Strength 3 times a day as needed for pain  Apply Voltaren cream as needed    Rosacea  Apply metronidazole 0.75% gel twice daily    Atypical chest pain  Most likely due to anxiety  No recent chest pain  Try to cope with anxiety by breathing exercises, meditation, yoga, continue valerian root at night    Anxiety  Was on hydroxyzine in the past  Continue " valerian root at bedtime  Try to cope with anxiety by breathing exercises, listening to music or yoga    Nocturia  Due to grade 3 cervical prolapse and a cystocele.   Follow-up with GYN    Dyslipidemia  Declined medications  The patient is currently on low fat low cholesterol diet  I recommend Mediterranean diet, which include fish, chicken, vegetables and olive oil  Exercise daily for 30 minutes at least 3 times a week  CT cardiac score, requisition is given to the patient  We obtained lipid panel today    Low back pain  Improved  Apply heating pads    Depression  Stable  Failed Lexapro due to side effects  Self stopped Cymbalta and hydroxyzine    Tinnitus  Stable    Muscle cramp  Improved with magnesium 400 mg daily      Hypothyroidism  Continue Euthyrox 75 mcg 6 days a week and skip on Sunday  Continue to exercise regularly  Follow up with endocrinology Dr. Roman scheduled October 6     herpes virus  Stable  No recent exacerbations  acyclovir 400 mg twice daily for prophylaxis     Osteopenia  Self stopped calcium due to constipation.  Continue Vitamin D - 2000 IU daily   Bone density 1/23/2023    Atrophic vaginitis  On estradiol cream 0.01% once a week  gynecology has retired, referral is given    Body mass index is 26.34 kg/m².  Continue low-fat low-cholesterol diet  Try to exercise at least 30 minutes daily    Wellness exam 9/21/2023  Pneumovax vaccine up to date   Shingrix up to date   Influenza declined  Mammogram 4/18/24  Colonoscopy, ordered, not done  DEXA bone scan 1/23/2023  Pap Smear up-to-date  Follow-up with GYN Dr. Dailey  See dentist twice a year  Yearly eye exam  see dermatology once a year for a skin check.    We obtained fasting blood work  I will call with results

## 2024-11-08 NOTE — PROGRESS NOTES
"Subjective   Reason for Visit: Cony Lovell is an 69 y.o. female here for a Medicare Wellness visit.     Past Medical, Surgical, and Family History reviewed and updated in chart.    Reviewed all medications by prescribing practitioner or clinical pharmacist (such as prescriptions, OTCs, herbal therapies and supplements) and documented in the medical record.    HPI    Patient Care Team:  Era Hopson PA-C as PCP - General (Internal Medicine)     Review of Systems    Objective   Vitals:  /68 (Patient Position: Sitting)   Temp 36.7 °C (98.1 °F) (Temporal)   Ht 1.575 m (5' 2\")   Wt 65.3 kg (144 lb)   Breastfeeding No   BMI 26.34 kg/m²       Physical Exam    Assessment & Plan  Vaginal yeast infection    Orders:    fluconazole (Diflucan) 100 mg tablet; Take 1 tablet (100 mg) by mouth once daily for 5 doses.    Mild vitamin D deficiency         Herpes zoster without complication    Orders:    acyclovir (Zovirax) 400 mg tablet; Take 1 tablet (400 mg) by mouth once daily.    Acquired hypothyroidism    Orders:    Thyroid Stimulating Hormone    Urinary frequency    Orders:    POCT UA (Automated) docked device    Hypercholesteremia    Orders:    Alanine Aminotransferase    Aspartate Aminotransferase    Lipid Panel    Other fatigue    Orders:    Basic Metabolic Panel    CBC w/5 Part Differential, [facility]    Neck pain    Orders:    Referral to Physical Therapy; Future    Thyroid nodule    Orders:     thyroid; Future    Rosacea    Orders:    metroNIDAZOLE (Metrocream) 0.75 % cream; Apply topically 2 times a day. Apply twice daily to rosacea    Routine general medical examination at health care facility    Orders:    1 Year Follow Up In Primary Care - Wellness Exam; Future    Asymptomatic microscopic hematuria    Orders:    Urine Culture    Anxiety and depression         Atrophy of vagina         Atypical chest pain                   "

## 2024-11-09 LAB — BACTERIA UR CULT: NORMAL

## 2024-11-12 ENCOUNTER — HOSPITAL ENCOUNTER (OUTPATIENT)
Dept: RADIOLOGY | Facility: CLINIC | Age: 69
Discharge: HOME | End: 2024-11-12
Payer: COMMERCIAL

## 2024-11-12 DIAGNOSIS — E04.1 THYROID NODULE: ICD-10-CM

## 2024-11-12 PROCEDURE — 76536 US EXAM OF HEAD AND NECK: CPT | Performed by: RADIOLOGY

## 2024-11-12 PROCEDURE — 76536 US EXAM OF HEAD AND NECK: CPT

## 2025-02-05 DIAGNOSIS — E03.9 ACQUIRED HYPOTHYROIDISM: Primary | ICD-10-CM

## 2025-02-05 RX ORDER — LEVOTHYROXINE SODIUM 75 UG/1
75 TABLET ORAL DAILY
Qty: 90 TABLET | Refills: 0 | Status: SHIPPED | OUTPATIENT
Start: 2025-02-05

## 2025-04-18 ENCOUNTER — OFFICE VISIT (OUTPATIENT)
Dept: PRIMARY CARE | Facility: CLINIC | Age: 70
End: 2025-04-18
Payer: COMMERCIAL

## 2025-04-18 VITALS
WEIGHT: 147 LBS | SYSTOLIC BLOOD PRESSURE: 100 MMHG | BODY MASS INDEX: 26.89 KG/M2 | TEMPERATURE: 96.6 F | DIASTOLIC BLOOD PRESSURE: 64 MMHG

## 2025-04-18 DIAGNOSIS — E55.9 MILD VITAMIN D DEFICIENCY: ICD-10-CM

## 2025-04-18 DIAGNOSIS — Z13.1 DIABETES MELLITUS SCREENING: ICD-10-CM

## 2025-04-18 DIAGNOSIS — E03.9 ACQUIRED HYPOTHYROIDISM: ICD-10-CM

## 2025-04-18 DIAGNOSIS — B35.4 TINEA CORPORIS: Primary | ICD-10-CM

## 2025-04-18 DIAGNOSIS — F32.A ANXIETY AND DEPRESSION: ICD-10-CM

## 2025-04-18 DIAGNOSIS — Z00.00 ROUTINE GENERAL MEDICAL EXAMINATION AT HEALTH CARE FACILITY: ICD-10-CM

## 2025-04-18 DIAGNOSIS — E53.8 VITAMIN B12 DEFICIENCY: ICD-10-CM

## 2025-04-18 DIAGNOSIS — E78.00 HYPERCHOLESTEREMIA: ICD-10-CM

## 2025-04-18 DIAGNOSIS — F41.9 ANXIETY AND DEPRESSION: ICD-10-CM

## 2025-04-18 LAB
25(OH)D3 SERPL-MCNC: 39 NG/ML (ref 30–100)
ALT SERPL W P-5'-P-CCNC: 19 U/L (ref 16–63)
ANION GAP SERPL CALC-SCNC: 13 MMOL/L (ref 10–20)
AST SERPL W P-5'-P-CCNC: 17 U/L (ref 15–37)
BASOPHILS # BLD AUTO: 0.01 X10*3/UL (ref 0.1–1.6)
BASOPHILS NFR BLD AUTO: 0.33 % (ref 0–0.3)
BUN SERPL-MCNC: 15 MG/DL (ref 7–18)
CALCIUM SERPL-MCNC: 8.9 MG/DL (ref 8.5–10.1)
CHLORIDE SERPL-SCNC: 103 MMOL/L (ref 98–107)
CHOLEST SERPL-MCNC: 270 MG/DL (ref 0–199)
CHOLESTEROL/HDL RATIO: 3.5 (ref 4.2–7)
CO2 SERPL-SCNC: 28 MMOL/L (ref 21–32)
CREAT SERPL-MCNC: 0.64 MG/DL (ref 0.6–1.1)
EGFRCR SERPLBLD CKD-EPI 2021: >90 ML/MIN/1.73M*2
EOSINOPHIL # BLD AUTO: 0.03 X10*3/UL (ref 0.04–0.5)
EOSINOPHIL NFR BLD AUTO: 0.76 % (ref 0.7–7)
ERYTHROCYTE [DISTWIDTH] IN BLOOD BY AUTOMATED COUNT: 13.9 % (ref 11.5–14.5)
GLUCOSE SERPL-MCNC: 94 MG/DL (ref 74–100)
HBA1C MFR BLD: 5.4 %
HCT VFR BLD AUTO: 39.4 % (ref 36.6–46.6)
HDLC SERPL-MCNC: 78 MG/DL (ref 40–59)
HGB BLD-MCNC: 13.68 G/DL (ref 12–15.4)
IS PATIENT FASTING: YES
LDLC SERPL DIRECT ASSAY-MCNC: 162 MG/DL (ref 0–100)
LYMPHOCYTES # BLD AUTO: 1.48 X10*3/UL (ref 0–6)
LYMPHOCYTES NFR BLD AUTO: 37.02 % (ref 20.5–51.1)
MCH RBC QN AUTO: 31.9 PG (ref 26–32)
MCHC RBC AUTO-ENTMCNC: 34.7 G/DL (ref 31–38)
MCV RBC AUTO: 91.9 FL (ref 80–96)
MONOCYTES # BLD AUTO: 0.29 X10*3/UL (ref 1.6–24.9)
MONOCYTES NFR BLD AUTO: 7.18 % (ref 1.7–9.3)
NEUTROPHILS # BLD AUTO: 2.18 X10*3/UL (ref 1.4–6.5)
NEUTROPHILS NFR BLD AUTO: 54.71 % (ref 42.2–75.2)
PLATELET # BLD AUTO: 240.5 X10*3/UL (ref 150–450)
PMV BLD AUTO: 8.02 FL (ref 7.8–11)
POTASSIUM SERPL-SCNC: 4 MMOL/L (ref 3.5–5.1)
RBC # BLD AUTO: 4.29 X10*6/UL (ref 3.9–5.3)
SODIUM SERPL-SCNC: 140 MMOL/L (ref 136–145)
TRIGL SERPL-MCNC: 87 MG/DL
TSH SERPL-ACNC: 2.26 MIU/L (ref 0.44–3.98)
VIT B12 SERPL-MCNC: 599 PG/ML (ref 193–986)
WBC # BLD AUTO: 3.99 X10*3/UL (ref 4.5–10.5)

## 2025-04-18 RX ORDER — CLOTRIMAZOLE 1 %
CREAM (GRAM) TOPICAL 2 TIMES DAILY
Qty: 30 G | Refills: 0 | Status: SHIPPED | OUTPATIENT
Start: 2025-04-18

## 2025-04-18 ASSESSMENT — ENCOUNTER SYMPTOMS
FACIAL SWELLING: 0
NUMBNESS: 0
DIZZINESS: 0
SHORTNESS OF BREATH: 0
FATIGUE: 0
HEADACHES: 0
PALPITATIONS: 0
COUGH: 0
POLYDIPSIA: 0
FEVER: 0
LOSS OF SENSATION IN FEET: 0
POLYPHAGIA: 0
ANAL BLEEDING: 0
MYALGIAS: 0
EYE PAIN: 0
ARTHRALGIAS: 0
WEAKNESS: 0
NAUSEA: 0
JOINT SWELLING: 0
DIFFICULTY URINATING: 0
WHEEZING: 0
DIARRHEA: 0
CONSTIPATION: 0
HEMATURIA: 0
CHOKING: 0
COLOR CHANGE: 0
SLEEP DISTURBANCE: 0
OCCASIONAL FEELINGS OF UNSTEADINESS: 0
DEPRESSION: 0
CHEST TIGHTNESS: 0
ABDOMINAL DISTENTION: 0
CONFUSION: 0
VOMITING: 0
FREQUENCY: 0
ABDOMINAL PAIN: 0
CHILLS: 0
APPETITE CHANGE: 0
EYE DISCHARGE: 0
SORE THROAT: 0
NERVOUS/ANXIOUS: 0
TREMORS: 0

## 2025-04-18 ASSESSMENT — ACTIVITIES OF DAILY LIVING (ADL)
GROOMING: INDEPENDENT
DOING HOUSEWORK: INDEPENDENT
HEARING - RIGHT EAR: FUNCTIONAL
ADEQUATE_TO_COMPLETE_ADL: YES
BATHING: INDEPENDENT
FEEDING YOURSELF: INDEPENDENT
USING TRANSPORTATION: INDEPENDENT
ASSISTIVE_DEVICE: EYEGLASSES
JUDGMENT_ADEQUATE_SAFELY_COMPLETE_DAILY_ACTIVITIES: YES
GROCERY SHOPPING: INDEPENDENT
TOILETING: INDEPENDENT
PATIENT'S MEMORY ADEQUATE TO SAFELY COMPLETE DAILY ACTIVITIES?: YES
HEARING - LEFT EAR: FUNCTIONAL
PILL BOX USED: YES
NEEDS ASSISTANCE WITH FOOD: INDEPENDENT
TAKING MEDICATION: INDEPENDENT
WALKS IN HOME: INDEPENDENT
STIL DRIVING: YES
MANAGING FINANCES: INDEPENDENT
PREPARING MEALS: INDEPENDENT
USING TELEPHONE: INDEPENDENT
DRESSING YOURSELF: INDEPENDENT
EATING: INDEPENDENT

## 2025-04-18 ASSESSMENT — GERIATRIC MINI NUTRITIONAL ASSESSMENT (MNA)
C GENERAL MOBILITY: GOES OUT
D HAS SUFFERED PSYCHOLOGICAL STRESS OR ACUTE DISEASE IN THE PAST 3 MONTHS?: NO
B WEIGHT LOSS DURING THE LAST 3 MONTHS: NO WEIGHT LOSS
A HAS FOOD INTAKE DECLINED OVER THE PAST 3 MONTHS DUE TO LOSS OF APPETITE, DIGESTIVE PROBLEMS, CHEWING OR SWALLOWING DIFFICULTIES?: NO DECREASE IN FOOD INTAKE
E NEUROPSYCHOLOGICAL PROBLEMS: NO PSYCHOLOGICAL PROBLEMS

## 2025-04-18 ASSESSMENT — COLUMBIA-SUICIDE SEVERITY RATING SCALE - C-SSRS
6. HAVE YOU EVER DONE ANYTHING, STARTED TO DO ANYTHING, OR PREPARED TO DO ANYTHING TO END YOUR LIFE?: NO
1. IN THE PAST MONTH, HAVE YOU WISHED YOU WERE DEAD OR WISHED YOU COULD GO TO SLEEP AND NOT WAKE UP?: NO
2. HAVE YOU ACTUALLY HAD ANY THOUGHTS OF KILLING YOURSELF?: NO

## 2025-04-18 ASSESSMENT — PATIENT HEALTH QUESTIONNAIRE - PHQ9
1. LITTLE INTEREST OR PLEASURE IN DOING THINGS: NOT AT ALL
SUM OF ALL RESPONSES TO PHQ9 QUESTIONS 1 AND 2: 0
2. FEELING DOWN, DEPRESSED OR HOPELESS: NOT AT ALL

## 2025-04-18 ASSESSMENT — PAIN SCALES - GENERAL: PAINLEVEL_OUTOF10: 0-NO PAIN

## 2025-04-18 NOTE — PROGRESS NOTES
Subjective   Reason for Visit: Cony Lovell is an 70 y.o. female here for a Medicare Wellness visit.          Reviewed all medications by prescribing practitioner or clinical pharmacist (such as prescriptions, OTCs, herbal therapies and supplements) and documented in the medical record.    Patient Care Team:  Era Hopson PA-C as PCP - General (Internal Medicine)  Emmy Paredes MD as PCP - [facility] PCP     Review of Systems   Skin:  Positive for rash.       Objective   Vitals:  Temp 35.9 °C (96.6 °F) (Temporal)   Wt 66.7 kg (147 lb)   Breastfeeding No   BMI 26.89 kg/m²       Physical Exam    Assessment & Plan

## 2025-04-18 NOTE — PROGRESS NOTES
Subjective   Reason for Visit: Cony Lovell is an 70 y.o. female here for a Medicare Wellness visit.     Past Medical, Surgical, and Family History reviewed and updated in chart.    Reviewed all medications by prescribing practitioner or clinical pharmacist (such as prescriptions, OTCs, herbal therapies and supplements) and documented in the medical record.    HPI    Patient Care Team:  Era Hopson PA-C as PCP - General (Internal Medicine)  Emmy Paredes MD as PCP - [facility] PCP     Review of Systems    Objective   Vitals:  /64 (Patient Position: Sitting)   Temp 35.9 °C (96.6 °F) (Temporal)   Wt 66.7 kg (147 lb)   Breastfeeding No   BMI 26.89 kg/m²       Physical Exam    Assessment & Plan  Tinea corporis    Orders:    clotrimazole (Lotrimin) 1 % cream; Apply topically 2 times a day.    Routine general medical examination at health care facility    Orders:    1 Year Follow Up In Primary Care - Wellness Exam; Future    Vitamin B12 deficiency    Orders:    Vitamin B12    Mild vitamin D deficiency    Orders:    Vitamin D 25-Hydroxy,Total (for eval of Vitamin D levels)    Acquired hypothyroidism    Orders:    Thyroid Stimulating Hormone    Hypercholesteremia    Orders:    Alanine Aminotransferase    Aspartate Aminotransferase    Lipid Panel    Anxiety and depression    Orders:    Basic Metabolic Panel    CBC w/5 Part Differential, [facility] [place] [facility]    Thyroid Stimulating Hormone    Diabetes mellitus screening    Orders:    Hemoglobin A1C

## 2025-04-18 NOTE — ASSESSMENT & PLAN NOTE
Orders:    1 Year Follow Up In Primary Care - Wellness Exam; Future    
  Orders:    Alanine Aminotransferase    Aspartate Aminotransferase    Lipid Panel    
  Orders:    Basic Metabolic Panel    CBC w/5 Part Differential, [facility]    Thyroid Stimulating Hormone    
  Orders:    Hemoglobin A1C    
  Orders:    Thyroid Stimulating Hormone    
  Orders:    Vitamin B12    
  Orders:    Vitamin D 25-Hydroxy,Total (for eval of Vitamin D levels)    
  Orders:    clotrimazole (Lotrimin) 1 % cream; Apply topically 2 times a day.    
no

## 2025-04-30 DIAGNOSIS — E03.9 ACQUIRED HYPOTHYROIDISM: ICD-10-CM

## 2025-04-30 RX ORDER — LEVOTHYROXINE SODIUM 75 UG/1
TABLET ORAL
Qty: 90 TABLET | Refills: 0 | Status: SHIPPED | OUTPATIENT
Start: 2025-04-30

## 2025-06-02 DIAGNOSIS — E03.9 ACQUIRED HYPOTHYROIDISM: ICD-10-CM

## 2025-06-02 DIAGNOSIS — B35.4 TINEA CORPORIS: ICD-10-CM

## 2025-06-02 RX ORDER — CLOTRIMAZOLE 1 %
CREAM (GRAM) TOPICAL 2 TIMES DAILY
Qty: 30 G | Refills: 3 | Status: SHIPPED | OUTPATIENT
Start: 2025-06-02

## 2025-06-02 RX ORDER — LEVOTHYROXINE SODIUM 75 UG/1
75 TABLET ORAL DAILY
Qty: 90 TABLET | Refills: 3 | Status: SHIPPED | OUTPATIENT
Start: 2025-06-02

## 2026-04-10 ENCOUNTER — APPOINTMENT (OUTPATIENT)
Dept: PRIMARY CARE | Facility: CLINIC | Age: 71
End: 2026-04-10
Payer: COMMERCIAL